# Patient Record
Sex: MALE | Race: WHITE | NOT HISPANIC OR LATINO | Employment: OTHER | URBAN - METROPOLITAN AREA
[De-identification: names, ages, dates, MRNs, and addresses within clinical notes are randomized per-mention and may not be internally consistent; named-entity substitution may affect disease eponyms.]

---

## 2017-01-10 ENCOUNTER — GENERIC CONVERSION - ENCOUNTER (OUTPATIENT)
Dept: OTHER | Facility: OTHER | Age: 72
End: 2017-01-10

## 2017-02-06 ENCOUNTER — GENERIC CONVERSION - ENCOUNTER (OUTPATIENT)
Dept: OTHER | Facility: OTHER | Age: 72
End: 2017-02-06

## 2017-02-22 ENCOUNTER — ALLSCRIPTS OFFICE VISIT (OUTPATIENT)
Dept: OTHER | Facility: OTHER | Age: 72
End: 2017-02-22

## 2017-06-05 DIAGNOSIS — N18.30 CHRONIC KIDNEY DISEASE, STAGE III (MODERATE) (HCC): ICD-10-CM

## 2017-06-13 ENCOUNTER — ALLSCRIPTS OFFICE VISIT (OUTPATIENT)
Dept: OTHER | Facility: OTHER | Age: 72
End: 2017-06-13

## 2017-12-04 DIAGNOSIS — N18.30 CHRONIC KIDNEY DISEASE, STAGE III (MODERATE) (HCC): ICD-10-CM

## 2017-12-05 ENCOUNTER — GENERIC CONVERSION - ENCOUNTER (OUTPATIENT)
Dept: OTHER | Facility: OTHER | Age: 72
End: 2017-12-05

## 2017-12-06 ENCOUNTER — ALLSCRIPTS OFFICE VISIT (OUTPATIENT)
Dept: OTHER | Facility: OTHER | Age: 72
End: 2017-12-06

## 2017-12-07 NOTE — PROGRESS NOTES
Assessment  1  CKD (chronic kidney disease), stage III (585 3) (N18 3)   2  Benign hypertension with chronic kidney disease, stage III (403 10,585 3) (I12 9,N18 3)   3  Diabetes mellitus with diabetic nephropathy (250 40,583 81) (E11 21)   4  Proteinuria (791 0) (R80 9)    Plan  CKD (chronic kidney disease), stage III    · HydroCHLOROthiazide 12 5 MG Oral Tablet; TAKE 1 TABLET DAILY   Rx By: Michele Lopez; Dispense: 90 Days ; #:90 Tablet; Refill: 2;For: CKD (chronic kidney disease), stage III; BRITTNY = N; Sent To: [facility]  · (1) BASIC METABOLIC PROFILE; Status:Active; Requested for:64Lwm3119;    Perform:[facility]; Due:40Bjk2195; Ordered; For:CKD (chronic kidney disease), stage III; Ordered By:Darion Ferrer;   · (1) CBC/ PLT (NO DIFF); Status:Active; Requested QEY:[id number]; Perform:[facility]; Due:04Jun2019;Ordered;(chronic kidney disease), stage III; Ordered By:Darion Ferrer;   · (1) MICROALBUMIN CREATININE RATIO, RANDOM URINE; Status:Active; Requestedfor:04Jun2018; Perform:[facility]; HSI:[id number];MFFTUQP; For:CKD (chronic kidney disease), stage III; Ordered By:Darion Ferrer;   · (1) PTH N-TERMINAL (INTACT); Status:Active; Requested UES:[id number]; Perform:[facility]; BNO:[id number];CAOHLOG; For:CKD (chronic kidney disease), stage III; Ordered By:Darion Ferrer;   · (1) RENAL FUNCTION PANEL; Status:Active; Requested WXA:[id number]; Perform:[facility]; Due:04Jun2019;Ordered;(chronic kidney disease), stage III; Ordered By:Alexandria Ferrer Miss;   · Follow-up visit in 6 months Evaluation and Treatment  Follow-up  Status: Hold For -Scheduling  Requested for: 43SUN1058   Ordered;CKD (chronic kidney disease), stage III; Ordered By: Michele Lopez Performed:  Due: 35EJQ5023    Discussion/Summary    Stage 3 chronic kidney disease, baseline creatinine 1 7, GFR 08/30/2039  his kidney function is fairly stable, baseline creatinine again at 1 7   His underlying disease is certainly multifactorial but some component diabetic nephropathy given his microalbuminuria  Unfortunately his blood pressure is still not optimally controlled, he is not currently taking a diuretic, would like to add small dose of hydrochlorothiazide, 12 5 mg daily  We will plan on repeating laboratory studies in a few weeks to ensure that his electrolytes and renal function are stable  Additionally he is to start checking his blood pressure at home and record this for diary  Obviously if it is not significantly improved his call the office for further recommendations  Otherwise I would like to see him in approximately 6 months with repeat laboratory studies that time  again likely secondary diabetic nephropathy, will add thiazide diuretic for better blood pressure control  hyperparathyroidism, PTH improved at 58 3, previously 97 6  The patient was counseled regarding instructions for management,-- impressions  The patient has the current Goals: Continued stability of his chronic kidney disease    improved blood pressure control  The patent has the current Barriers: None  Patient is able to Self-Care  Possible side effects of new medications were reviewed with the patient/guardian today  The treatment plan was reviewed with the patient/guardian  The patient/guardian understands and agrees with the treatment plan      Reason For Visit  chronic kidney disease      History of Present Illness  We had the pleasure of seeing Macrina Flores for nephrology follow-up secondary to previous diagnosis of stage 3 chronic kidney disease  has been doing reasonably well  He denies any significant chest pain, shortness of Breath overseen lower extremity swelling  His appetite has been normal  His blood sugars have improved, is now taking less Lantus  He denies any recent hospitalizations or illnesses  Review of Systems   Constitutional: no fever-- and-- no fatigue  Integumentary: no rashes    Gastrointestinal: no nausea,-- no diarrhea-- and-- no vomiting  Respiratory: no shortness of breath  Cardiovascular: lower extremity edema, but-- no chest pain  Musculoskeletal: no joint pain  Neurological: no lightheadedness-- and-- no dizziness  Genitourinary: no dysuria  Current Meds   1  AmLODIPine Besylate 10 MG Oral Tablet; TAKE 1 TABLET DAILY; Therapy: (Recorded:52Rdr1115) to Recorded   2  Aspirin 81 MG Oral Tablet Chewable; CHEW AND SWALLOW 1 TABLET DAILY; Therapy: (Recorded:22Cdc4148) to Recorded   3  Atorvastatin Calcium 40 MG Oral Tablet; Take 1 tablet daily; Therapy: (Recorded:54Vzf9621) to Recorded   4  Carvedilol 12 5 MG Oral Tablet; TAKE 1 TABLET TWICE DAILY WITH MEALS; Therapy: (Recorded:80Evz6264) to Recorded   5  Carvedilol 6 25 MG Oral Tablet; TAKE 1 TABLET TWICE DAILY WITH MEALS; Therapy: (Recorded:02Opk8637) to Recorded   6  Eliquis 5 MG Oral Tablet; Take 1 tablet twice daily; Therapy: (Recorded:50Leb8266) to Recorded   7  Lantus 100 UNIT/ML Subcutaneous Solution; INJECT 30 UNIT Daily; Therapy: (Recorded:89Vmh4158) to Recorded   8  Levothyroxine Sodium 112 MCG Oral Tablet; TAKE 1 TABLET DAILY; Therapy: (Recorded:34Poj3160) to Recorded   9  Levothyroxine Sodium 50 MCG Oral Tablet; TAKE 1 TABLET DAILY; Therapy: (Recorded:17Nnt2755) to Recorded   10  Ramipril 2 5 MG Oral Capsule; TAKE 1 CAPSULE ONCE DAILY; Therapy: (Recorded:54Yss1876) to Recorded    The medication list was reviewed and updated today  Allergies  1  No Known Drug Allergies    Vitals  Vital Signs    Recorded: 55QYV2500 03:19PM Recorded: 98HEC3954 02:47PM   Heart Rate  80   Systolic 913 195, Sitting   Diastolic 90 490, Sitting   Height  5 ft 10 in   Weight  235 lb    BMI Calculated  33 72   BSA Calculated  2 24       Physical Exam   Constitutional: General appearance: No acute distress, well appearing and well nourished  Eyes: Anicteric sclerae  JVD:  No JVD present    Pulmonary: Respiratory effort: No increased work of breathing or signs of respiratory distress  -- Auscultation of lungs: Clear to auscultation  Cardiovascular: Auscultation of heart: Normal rate and rhythm, normal S1 and S2, without murmurs  Abdomen: Non-tender, no masses  Extremities: Extremities are abnormal  Extremities: bilateral extremities have 1+ pitting edema  Rash: No rash present    Neurologic: Non Focal     Psychiatric: Orientation to person, place, and time: Normal  -- and-- Mood and affect: Normal        Results/Data  Nephrology Flowsheet 09BQY7323 01:53PM Kenan Scruggs     Test Name Result Flag Reference   WBC 7 8     Hemoglobin 12 6     Hematocrit 37 6     Platelets 035     Sodium 143     Potassium 4 8     Chloride 111     Carbon Dioxide 23     Calcium 8 9     GLUCOSE 104     BUN 40     Serum Creatinine 1 74     GFR, NON-AFRICAN AMERICAN 39     Phosphorus 2 8     Albumin 3 6     PTH 58 3     Uric Acid 7 7     Microalbumin Creatinine Ratio 778           Signatures   Electronically signed by : Pilar Allan DO; Dec  6 2017  3:20PM EST                       (Author)

## 2017-12-19 ENCOUNTER — GENERIC CONVERSION - ENCOUNTER (OUTPATIENT)
Dept: OTHER | Facility: OTHER | Age: 72
End: 2017-12-19

## 2018-01-12 VITALS
HEART RATE: 72 BPM | HEIGHT: 70 IN | BODY MASS INDEX: 33.64 KG/M2 | SYSTOLIC BLOOD PRESSURE: 118 MMHG | RESPIRATION RATE: 16 BRPM | DIASTOLIC BLOOD PRESSURE: 88 MMHG | WEIGHT: 235 LBS

## 2018-01-14 VITALS
DIASTOLIC BLOOD PRESSURE: 78 MMHG | HEIGHT: 70 IN | HEART RATE: 72 BPM | BODY MASS INDEX: 34.23 KG/M2 | WEIGHT: 239.13 LBS | RESPIRATION RATE: 16 BRPM | SYSTOLIC BLOOD PRESSURE: 132 MMHG

## 2018-01-23 VITALS
DIASTOLIC BLOOD PRESSURE: 90 MMHG | HEART RATE: 80 BPM | SYSTOLIC BLOOD PRESSURE: 160 MMHG | HEIGHT: 70 IN | WEIGHT: 235 LBS | BODY MASS INDEX: 33.64 KG/M2

## 2018-06-04 DIAGNOSIS — N18.30 CHRONIC KIDNEY DISEASE, STAGE III (MODERATE) (HCC): ICD-10-CM

## 2018-06-25 ENCOUNTER — OFFICE VISIT (OUTPATIENT)
Dept: NEPHROLOGY | Facility: CLINIC | Age: 73
End: 2018-06-25
Payer: COMMERCIAL

## 2018-06-25 VITALS — DIASTOLIC BLOOD PRESSURE: 88 MMHG | HEART RATE: 82 BPM | SYSTOLIC BLOOD PRESSURE: 148 MMHG

## 2018-06-25 DIAGNOSIS — I12.9 HYPERTENSIVE CHRONIC KIDNEY DISEASE WITH STAGE 1 THROUGH STAGE 4 CHRONIC KIDNEY DISEASE, OR UNSPECIFIED CHRONIC KIDNEY DISEASE: ICD-10-CM

## 2018-06-25 DIAGNOSIS — N18.30 CKD (CHRONIC KIDNEY DISEASE) STAGE 3, GFR 30-59 ML/MIN (HCC): Primary | ICD-10-CM

## 2018-06-25 DIAGNOSIS — N18.30 CKD STAGE 3 DUE TO TYPE 2 DIABETES MELLITUS (HCC): ICD-10-CM

## 2018-06-25 DIAGNOSIS — E11.22 CKD STAGE 3 DUE TO TYPE 2 DIABETES MELLITUS (HCC): ICD-10-CM

## 2018-06-25 PROCEDURE — 99213 OFFICE O/P EST LOW 20 MIN: CPT | Performed by: INTERNAL MEDICINE

## 2018-06-25 RX ORDER — CARVEDILOL 12.5 MG/1
1 TABLET ORAL 2 TIMES DAILY
COMMUNITY

## 2018-06-25 RX ORDER — LEVOTHYROXINE SODIUM 112 UG/1
1 TABLET ORAL DAILY
COMMUNITY

## 2018-06-25 RX ORDER — ATORVASTATIN CALCIUM 40 MG/1
1 TABLET, FILM COATED ORAL DAILY
COMMUNITY

## 2018-06-25 RX ORDER — INSULIN GLARGINE 100 [IU]/ML
25 INJECTION, SOLUTION SUBCUTANEOUS DAILY
COMMUNITY

## 2018-06-25 RX ORDER — RAMIPRIL 2.5 MG/1
1 CAPSULE ORAL DAILY
COMMUNITY
End: 2019-09-04 | Stop reason: SDUPTHER

## 2018-06-25 RX ORDER — LEVOTHYROXINE SODIUM 0.05 MG/1
1 TABLET ORAL DAILY
COMMUNITY

## 2018-06-25 RX ORDER — AMLODIPINE BESYLATE 10 MG/1
10 TABLET ORAL DAILY
COMMUNITY
Start: 2018-02-02 | End: 2021-09-13

## 2018-06-25 RX ORDER — FLUTICASONE PROPIONATE 50 MCG
SPRAY, SUSPENSION (ML) NASAL
COMMUNITY
Start: 2013-01-21

## 2018-06-25 RX ORDER — ASPIRIN 81 MG/1
1 TABLET, CHEWABLE ORAL DAILY
COMMUNITY

## 2018-06-25 NOTE — LETTER
June 25, 2018     Nina Abbott, [address] [facility]    Patient: Basilio Chirinos   YOB: 1945   Date of Visit: 6/25/2018     Dear Dr Manish Roberts      Thank you for referring Basilio Chirinos to me for evaluation  Below are the relevant portions of my assessment and plan of care  If you have questions, please do not hesitate to call me  I look forward to following Bhavin Espinoza along with you  Sincerely,        Renee Rose DO        CC: No Recipients    Progress Notes:    ASSESSMENT and PLAN:  1  Chronic kidney disease, stage III, most recent creatinine 1 62, estimated GFR 42  2  Diabetes with associated nephropathy  3  Peripheral vascular disease with recent left lower extremity amputation  4  Secondary hyperparathyroidism, PTH recently 40 2    · Overall renal function has remained fairly stable, creatinine slightly improved since his hospitalization  · Previously had added hydrochlorothiazide but will hold for now    Continue with low-dose ACE-inhibitor  · Continue to monitor blood pressure at home, call if consistently greater than 366 systolic  · Follow-up in 6 months with repeat laboratory studies at that time

## 2018-06-25 NOTE — PATIENT INSTRUCTIONS
ASSESSMENT and PLAN:  1  Chronic kidney disease, stage III, most recent creatinine 1 62, estimated GFR 42  2  Diabetes with associated nephropathy  3  Peripheral vascular disease with recent left lower extremity amputation  4  Secondary hyperparathyroidism, PTH recently 40 2    · Overall renal function has remained fairly stable, creatinine slightly improved since his hospitalization  · Previously had added hydrochlorothiazide but will hold for now    Continue with low-dose ACE-inhibitor  · Continue to monitor blood pressure at home, call if consistently greater than 904 systolic  · Follow-up in 6 months with repeat laboratory studies at that time

## 2018-06-25 NOTE — PROGRESS NOTES
NEPHROLOGY OUTPATIENT PROGRESS NOTE   Kailee Putnam 72 y o  male MRN: [id number]  Reason for visit:  Chronic kidney disease    ASSESSMENT and PLAN:  1  Chronic kidney disease, stage III, most recent creatinine 1 62, estimated GFR 42  2  Diabetes with associated nephropathy  3  Peripheral vascular disease with recent left lower extremity amputation  4  Secondary hyperparathyroidism, PTH recently 40 2    · Overall renal function has remained fairly stable, creatinine slightly improved since his hospitalization  · Previously had added hydrochlorothiazide but will hold for now  Continue with low-dose ACE-inhibitor  · Continue to monitor blood pressure at home, call if consistently greater than 998 systolic  · Follow-up in 6 months with repeat laboratory studies at that time    SUBJECTIVE / INTERVAL HISTORY:  Recently had admission to [facility] in January which resulted in a left below-the-knee amputation secondary to nonhealing wound  Reviewing his records at that time he did have some episodes of acute kidney injury which resolved, creatinine at discharge was approximately 2 0  Most recently 1 6  Currently he has 1 spot on his amputation site which will be addressed with his surgeon  Currently is being arranged for possible prosthesis placement  He denies any chest pain shortness of breath  Denies any abdominal pain, nausea vomiting diarrhea  Review of Systems      OBJECTIVE:  /88 (BP Location: Right arm, Patient Position: Sitting, Cuff Size: Adult)   Pulse 82     Physical Exam   Constitutional: He is oriented to person, place, and time  No distress  HENT:   Head: Normocephalic  Eyes: No scleral icterus  Neck: Neck supple  Cardiovascular: Normal rate and regular rhythm  Pulmonary/Chest: Effort normal and breath sounds normal    Abdominal: Soft  Bowel sounds are normal    Musculoskeletal: He exhibits edema (1+ right lower extremity edema)     Neurological: He is alert and oriented to person, place, and time  Skin: Skin is warm  Psychiatric: He has a normal mood and affect  Medications:    Current Outpatient Prescriptions:     amLODIPine (NORVASC) 10 mg tablet, Take 10 mg by mouth, Disp: , Rfl:     apixaban (ELIQUIS) 5 mg, Take 5 mg by mouth, Disp: , Rfl:     aspirin 81 mg chewable tablet, Chew 1 tablet daily, Disp: , Rfl:     atorvastatin (LIPITOR) 40 mg tablet, Take 1 tablet by mouth daily, Disp: , Rfl:     carvedilol (COREG) 12 5 mg tablet, Take 1 tablet by mouth Twice daily, Disp: , Rfl:     fluticasone (FLONASE) 50 mcg/act nasal spray, as needed  two sprays each nostril daily , Disp: , Rfl:     insulin glargine (LANTUS) 100 units/mL subcutaneous injection, Inject 30 Units under the skin daily, Disp: , Rfl:     levothyroxine 112 mcg tablet, Take 1 tablet by mouth daily, Disp: , Rfl:     levothyroxine 50 mcg tablet, Take 1 tablet by mouth daily, Disp: , Rfl:     ramipril (ALTACE) 2 5 mg capsule, Take 1 capsule by mouth daily, Disp: , Rfl:     Laboratory Results:  No results found for this or any previous visit

## 2019-02-21 LAB
CREAT ?TM UR-SCNC: 19 UMOL/L
EXT MICROALBUMIN URINE RANDOM: 22.1
MICROALBUMIN/CREAT UR: 1163.2 MG/G{CREAT}

## 2019-02-26 ENCOUNTER — TELEPHONE (OUTPATIENT)
Dept: NEPHROLOGY | Facility: CLINIC | Age: 74
End: 2019-02-26

## 2019-02-27 ENCOUNTER — OFFICE VISIT (OUTPATIENT)
Dept: NEPHROLOGY | Facility: CLINIC | Age: 74
End: 2019-02-27
Payer: COMMERCIAL

## 2019-02-27 VITALS
BODY MASS INDEX: 34.65 KG/M2 | DIASTOLIC BLOOD PRESSURE: 66 MMHG | WEIGHT: 242 LBS | HEART RATE: 80 BPM | SYSTOLIC BLOOD PRESSURE: 144 MMHG | HEIGHT: 70 IN

## 2019-02-27 DIAGNOSIS — N18.30 CKD STAGE 3 DUE TO TYPE 2 DIABETES MELLITUS (HCC): Primary | ICD-10-CM

## 2019-02-27 DIAGNOSIS — I12.9 HYPERTENSIVE CHRONIC KIDNEY DISEASE WITH STAGE 1 THROUGH STAGE 4 CHRONIC KIDNEY DISEASE, OR UNSPECIFIED CHRONIC KIDNEY DISEASE: ICD-10-CM

## 2019-02-27 DIAGNOSIS — R80.1 PERSISTENT PROTEINURIA: ICD-10-CM

## 2019-02-27 DIAGNOSIS — E11.22 CKD STAGE 3 DUE TO TYPE 2 DIABETES MELLITUS (HCC): Primary | ICD-10-CM

## 2019-02-27 PROCEDURE — 3066F NEPHROPATHY DOC TX: CPT | Performed by: INTERNAL MEDICINE

## 2019-02-27 PROCEDURE — 99214 OFFICE O/P EST MOD 30 MIN: CPT | Performed by: INTERNAL MEDICINE

## 2019-02-27 RX ORDER — FUROSEMIDE 40 MG/1
40 TABLET ORAL DAILY
COMMUNITY

## 2019-02-27 NOTE — PATIENT INSTRUCTIONS
ASSESSMENT and PLAN:  1  Chronic kidney disease, baseline creatinine 1 8-2 0, recent creatinine 1 9 estimated GFR 34  2  Diabetic nephropathy with associated proteinuria, last ratio approximately 1  3  Secondary hyperparathyroidism, PTH stable 108  4  Hypertension her blood pressure stable, continue with current regimen  5  History of congestive heart failure, volume status appears stable, continue with current diuretic regimen, suspect dry weight"closer to 235 lb    · Continue with current regimen  · Will plan to see him in 3 months with recheck on renal function as well as protein creatinine ratio is still significant proteinuria would likely start to increase his ACE-inhibitor    But given recent increase in diuretic dosing all leave his regimen the same

## 2019-02-27 NOTE — PROGRESS NOTES
NEPHROLOGY OUTPATIENT PROGRESS NOTE   Lyndon Owens 72 y o  male MRN: [id number]  Reason for visit:  Chronic kidney disease    ASSESSMENT and PLAN:  1  Chronic kidney disease, baseline creatinine 1 8-2 0, recent creatinine 1 9 estimated GFR 34  2  Diabetic nephropathy with associated proteinuria, last ratio approximately 1  3  Secondary hyperparathyroidism, PTH stable 108  4  Hypertension her blood pressure stable, continue with current regimen  5  History of congestive heart failure, volume status appears stable, continue with current diuretic regimen, suspect dry weight"closer to 235 lb    · Continue with current regimen  · Will plan to see him in 3 months with recheck on renal function as well as protein creatinine ratio is still significant proteinuria would likely start to increase his ACE-inhibitor  But given recent increase in diuretic dosing all leave his regimen the same    SUBJECTIVE / INTERVAL HISTORY:  He has been doing reasonably well  His last hospitalization was September of last year secondary to volume overload and congestive heart failure  His weight had gone up approximately 10 lb  His diuretic dosing recent was increased to 60 mg per day  Currently denies any chest pain shortness of breath or worsening swelling  Denies any abdominal bloating  His appetite has been stable  We did review his dietary history and there are some things that are high in salt content  Review of Systems      OBJECTIVE:  /66 (BP Location: Left arm, Patient Position: Sitting, Cuff Size: Large)   Pulse 80   Ht 5' 10" (1 778 m)   Wt 110 kg (242 lb)   BMI 34 72 kg/m²   Vitals:    02/27/19 1618   Weight: 110 kg (242 lb)       Physical Exam   Constitutional: He is oriented to person, place, and time  No distress  HENT:   Head: Normocephalic  Eyes: No scleral icterus  Neck: Neck supple  Cardiovascular: Normal rate and regular rhythm     Pulmonary/Chest: Effort normal and breath sounds normal  Abdominal: Soft  He exhibits no distension  Musculoskeletal: He exhibits edema (Plus one edema in the right lower leg)  Neurological: He is alert and oriented to person, place, and time  Skin: Skin is warm and dry  Medications:    Current Outpatient Medications:     amLODIPine (NORVASC) 10 mg tablet, Take 10 mg by mouth daily , Disp: , Rfl:     apixaban (ELIQUIS) 5 mg, Take 5 mg by mouth 2 (two) times a day , Disp: , Rfl:     aspirin 81 mg chewable tablet, Chew 1 tablet daily, Disp: , Rfl:     atorvastatin (LIPITOR) 40 mg tablet, Take 1 tablet by mouth daily, Disp: , Rfl:     carvedilol (COREG) 12 5 mg tablet, Take 1 tablet by mouth Twice daily, Disp: , Rfl:     fluticasone (FLONASE) 50 mcg/act nasal spray, as needed  two sprays each nostril daily , Disp: , Rfl:     furosemide (LASIX) 40 mg tablet, Take 60 mg by mouth daily, Disp: , Rfl:     insulin glargine (LANTUS) 100 units/mL subcutaneous injection, Inject 30 Units under the skin daily, Disp: , Rfl:     levothyroxine 112 mcg tablet, Take 1 tablet by mouth daily, Disp: , Rfl:     levothyroxine 50 mcg tablet, Take 1 tablet by mouth daily, Disp: , Rfl:     ramipril (ALTACE) 2 5 mg capsule, Take 1 capsule by mouth daily, Disp: , Rfl:     Laboratory Results:  Results for orders placed or performed in visit on 02/21/19   Microalbumin / creatinine urine ratio   Result Value Ref Range    EXT Creatinine Urine 19 0     Microalbum  ,U,Random 22 1     EXTERNAL Microalb/Creat Ratio 1,163 2

## 2019-02-27 NOTE — LETTER
February 27, 2019     Jeremy Barnett, [address] [facility] [address] [facility]    Patient: Lori Meza   YOB: 1945   Date of Visit: 2/27/2019     Dear Dr Magdaleno Said      Thank you for referring Lori Meza to me for evaluation  Below are the relevant portions of my assessment and plan of care  If you have questions, please do not hesitate to call me  I look forward to following Orquidea Early along with you  Sincerely,        Sarika Franco, DO        CC: No Recipients    Progress Notes:    ASSESSMENT and PLAN:  1  Chronic kidney disease, baseline creatinine 1 8-2 0, recent creatinine 1 9 estimated GFR 34  2  Diabetic nephropathy with associated proteinuria, last ratio approximately 1  3  Secondary hyperparathyroidism, PTH stable 108  4  Hypertension her blood pressure stable, continue with current regimen  5  History of congestive heart failure, volume status appears stable, continue with current diuretic regimen, suspect dry weight"closer to 235 lb    · Continue with current regimen  · Will plan to see him in 3 months with recheck on renal function as well as protein creatinine ratio is still significant proteinuria would likely start to increase his ACE-inhibitor    But given recent increase in diuretic dosing all leave his regimen the same

## 2019-06-08 LAB
CREAT ?TM UR-SCNC: 81.2 UMOL/L
EXT PROTEIN URINE: 53.6
PROT/CREAT UR: 0.66 MG/G{CREAT}

## 2019-06-11 ENCOUNTER — OFFICE VISIT (OUTPATIENT)
Dept: NEPHROLOGY | Facility: CLINIC | Age: 74
End: 2019-06-11
Payer: COMMERCIAL

## 2019-06-11 VITALS
HEART RATE: 68 BPM | SYSTOLIC BLOOD PRESSURE: 150 MMHG | WEIGHT: 233.6 LBS | BODY MASS INDEX: 33.44 KG/M2 | DIASTOLIC BLOOD PRESSURE: 60 MMHG | RESPIRATION RATE: 16 BRPM | HEIGHT: 70 IN

## 2019-06-11 DIAGNOSIS — E11.22 CKD STAGE 3 DUE TO TYPE 2 DIABETES MELLITUS (HCC): Primary | ICD-10-CM

## 2019-06-11 DIAGNOSIS — R80.1 PERSISTENT PROTEINURIA: ICD-10-CM

## 2019-06-11 DIAGNOSIS — D63.1 ANEMIA OF CHRONIC RENAL FAILURE, STAGE 3 (MODERATE) (HCC): ICD-10-CM

## 2019-06-11 DIAGNOSIS — R76.8 ELEVATED SERUM IMMUNOGLOBULIN FREE LIGHT CHAIN LEVEL: ICD-10-CM

## 2019-06-11 DIAGNOSIS — N18.30 ANEMIA OF CHRONIC RENAL FAILURE, STAGE 3 (MODERATE) (HCC): ICD-10-CM

## 2019-06-11 DIAGNOSIS — I12.9 HYPERTENSIVE CHRONIC KIDNEY DISEASE WITH STAGE 1 THROUGH STAGE 4 CHRONIC KIDNEY DISEASE, OR UNSPECIFIED CHRONIC KIDNEY DISEASE: ICD-10-CM

## 2019-06-11 DIAGNOSIS — N18.30 CKD STAGE 3 DUE TO TYPE 2 DIABETES MELLITUS (HCC): Primary | ICD-10-CM

## 2019-06-11 PROCEDURE — 3066F NEPHROPATHY DOC TX: CPT | Performed by: INTERNAL MEDICINE

## 2019-06-11 PROCEDURE — 99214 OFFICE O/P EST MOD 30 MIN: CPT | Performed by: INTERNAL MEDICINE

## 2019-08-30 LAB
CREAT ?TM UR-SCNC: 24.1 UMOL/L
EXT PROTEIN URINE: 24.5
PROT/CREAT UR: 1.02 MG/G{CREAT}

## 2019-09-04 ENCOUNTER — OFFICE VISIT (OUTPATIENT)
Dept: NEPHROLOGY | Facility: CLINIC | Age: 74
End: 2019-09-04
Payer: COMMERCIAL

## 2019-09-04 VITALS
RESPIRATION RATE: 15 BRPM | HEIGHT: 70 IN | SYSTOLIC BLOOD PRESSURE: 132 MMHG | DIASTOLIC BLOOD PRESSURE: 88 MMHG | HEART RATE: 84 BPM | WEIGHT: 230 LBS | BODY MASS INDEX: 32.93 KG/M2

## 2019-09-04 DIAGNOSIS — I12.9 HYPERTENSIVE CHRONIC KIDNEY DISEASE WITH STAGE 1 THROUGH STAGE 4 CHRONIC KIDNEY DISEASE, OR UNSPECIFIED CHRONIC KIDNEY DISEASE: ICD-10-CM

## 2019-09-04 DIAGNOSIS — N18.30 CKD STAGE 3 DUE TO TYPE 2 DIABETES MELLITUS (HCC): Primary | ICD-10-CM

## 2019-09-04 DIAGNOSIS — N18.30 ANEMIA OF CHRONIC RENAL FAILURE, STAGE 3 (MODERATE) (HCC): ICD-10-CM

## 2019-09-04 DIAGNOSIS — R76.8 ELEVATED SERUM IMMUNOGLOBULIN FREE LIGHT CHAIN LEVEL: ICD-10-CM

## 2019-09-04 DIAGNOSIS — D63.1 ANEMIA OF CHRONIC RENAL FAILURE, STAGE 3 (MODERATE) (HCC): ICD-10-CM

## 2019-09-04 DIAGNOSIS — E11.21 DIABETIC NEPHROPATHY ASSOCIATED WITH TYPE 2 DIABETES MELLITUS (HCC): ICD-10-CM

## 2019-09-04 DIAGNOSIS — E11.22 CKD STAGE 3 DUE TO TYPE 2 DIABETES MELLITUS (HCC): Primary | ICD-10-CM

## 2019-09-04 PROCEDURE — 99214 OFFICE O/P EST MOD 30 MIN: CPT | Performed by: INTERNAL MEDICINE

## 2019-09-04 RX ORDER — RAMIPRIL 5 MG/1
5 CAPSULE ORAL DAILY
Qty: 90 CAPSULE | Refills: 2 | Status: SHIPPED | OUTPATIENT
Start: 2019-09-04 | End: 2021-09-13

## 2019-09-04 NOTE — PROGRESS NOTES
NEPHROLOGY OUTPATIENT PROGRESS NOTE   Tory Flynn 69 y o  male MRN: [id number]  Reason for visit:  Chronic kidney disease      ASSESSMENT and PLAN:  1  Chronic kidney disease, stage III, previous baseline creatinine 1 7-1 9, most recent creatinine 2 15 with an estimated GFR 30, suspecting underlying disease likely secondary diabetic nephropathy  2  Diabetes, last hemoglobin A1c 7 4  3  Proteinuria, last protein creatinine ratio approximately 1 0, increase ramipril to 5 mg daily, repeat BMP in 1 week  4  Hyperuricemia without signs of gout, uric acid slightly improved to 10 0  5  Abnormal free light chain assays, previous serum in urine electrophoresis negative, awaiting repeat SPEP/UPEP  6  Anemia, hemoglobin improved at 12 5  7  Mineral bone disorder, PTH stable at 73 9, phosphorus 3 6, calcium 9 3    · Overall renal function remains fairly stable  · Try to increase ACE-inhibitor to 5 mg to help with proteinuria  · Awaiting SPEP/UPEP given abnormal free light chain assays  · Repeat laboratory studies in 1 month with increase in ACE-inhibitor  · Assuming repeat lab studies are stable, follow-up in 3 months with repeat labs at that time  SUBJECTIVE / INTERVAL HISTORY:  He has been doing reasonably well  Denies any recent hospitalizations or illnesses  Denies any chest pain shortness of breath or swelling  Denies any abdominal bloating  Appetite has been normal without episodes of nausea vomiting diarrhea  Review of Systems      OBJECTIVE:  /88 (BP Location: Right arm, Patient Position: Sitting, Cuff Size: Large)   Pulse 84   Resp 15   Ht 5' 10" (1 778 m)   Wt 104 kg (230 lb)   BMI 33 00 kg/m²   Vitals:    09/04/19 1551   Weight: 104 kg (230 lb)       Physical Exam   Constitutional: He is oriented to person, place, and time  No distress  HENT:   Head: Normocephalic  Eyes: No scleral icterus  Neck: Neck supple  Cardiovascular: Normal rate and regular rhythm     Pulmonary/Chest: Effort normal    Abdominal: Soft  Musculoskeletal: He exhibits no edema  Neurological: He is alert and oriented to person, place, and time  Skin: Skin is warm and dry  Medications:    Current Outpatient Medications:     amLODIPine (NORVASC) 10 mg tablet, Take 10 mg by mouth daily , Disp: , Rfl:     apixaban (ELIQUIS) 5 mg, Take 5 mg by mouth 2 (two) times a day , Disp: , Rfl:     aspirin 81 mg chewable tablet, Chew 1 tablet daily, Disp: , Rfl:     atorvastatin (LIPITOR) 40 mg tablet, Take 1 tablet by mouth daily, Disp: , Rfl:     carvedilol (COREG) 12 5 mg tablet, Take 1 tablet by mouth Twice daily, Disp: , Rfl:     fluticasone (FLONASE) 50 mcg/act nasal spray, as needed   two sprays each nostril daily , Disp: , Rfl:     furosemide (LASIX) 40 mg tablet, Take 60 mg by mouth daily, Disp: , Rfl:     insulin glargine (LANTUS) 100 units/mL subcutaneous injection, Inject 30 Units under the skin daily, Disp: , Rfl:     levothyroxine 112 mcg tablet, Take 1 tablet by mouth daily, Disp: , Rfl:     levothyroxine 50 mcg tablet, Take 1 tablet by mouth daily, Disp: , Rfl:     ramipril (ALTACE) 2 5 mg capsule, Take 1 capsule by mouth daily, Disp: , Rfl:     Laboratory Results:  Results for orders placed or performed in visit on 06/08/19   Protein / creatinine ratio, urine   Result Value Ref Range    PROTEIN UA 53 6     EXT Creatinine Urine 81 2     EXTERNAL Ur Prot/Creat Ratio 0 66

## 2019-09-04 NOTE — PATIENT INSTRUCTIONS
ASSESSMENT and PLAN:  1  Chronic kidney disease, stage III, previous baseline creatinine 1 7-1 9, most recent creatinine 2 15 with an estimated GFR 30, suspecting underlying disease likely secondary diabetic nephropathy  2  Diabetes, last hemoglobin A1c 7 4  3  Proteinuria, last protein creatinine ratio approximately 1 0, increase ramipril to 5 mg daily, repeat BMP in 1 week  4  Hyperuricemia without signs of gout, uric acid slightly improved to 10 0  5  Abnormal free light chain assays, previous serum in urine electrophoresis negative, awaiting repeat SPEP/UPEP  6  Anemia, hemoglobin improved at 12 5  7  Mineral bone disorder, PTH stable at 73 9, phosphorus 3 6, calcium 9 3    · Overall renal function remains fairly stable  · Try to increase ACE-inhibitor to 5 mg to help with proteinuria  · Awaiting SPEP/UPEP given abnormal free light chain assays  · Repeat laboratory studies in 1 month with increase in ACE-inhibitor  · Assuming repeat lab studies are stable, follow-up in 3 months with repeat labs at that time

## 2019-09-04 NOTE — LETTER
September 4, 2019     Muna Honeycutt, [address] [facility] [address] [facility]    Patient: Jenny Marks   YOB: 1945   Date of Visit: 9/4/2019       Dear Dr Cruz Axon: Thank you for referring Jenny Marks to me for evaluation  Below are the relevant portions of my assessment and plan of care  If you have questions, please do not hesitate to call me  I look forward to following Tommie Glaser along with you  Sincerely,        Pilar Allan,         CC: No Recipients                         ASSESSMENT and PLAN:  1  Chronic kidney disease, stage III, previous baseline creatinine 1 7-1 9, most recent creatinine 2 15 with an estimated GFR 30, suspecting underlying disease likely secondary diabetic nephropathy  2  Diabetes, last hemoglobin A1c 7 4  3  Proteinuria, last protein creatinine ratio approximately 1 0, increase ramipril to 5 mg daily, repeat BMP in 1 week  4  Hyperuricemia without signs of gout, uric acid slightly improved to 10 0  5  Abnormal free light chain assays, previous serum in urine electrophoresis negative, awaiting repeat SPEP/UPEP  6  Anemia, hemoglobin improved at 12 5  7  Mineral bone disorder, PTH stable at 73 9, phosphorus 3 6, calcium 9 3    · Overall renal function remains fairly stable  · Try to increase ACE-inhibitor to 5 mg to help with proteinuria  · Awaiting SPEP/UPEP given abnormal free light chain assays  · Repeat laboratory studies in 1 month with increase in ACE-inhibitor  · Assuming repeat lab studies are stable, follow-up in 3 months with repeat labs at that time

## 2019-10-03 ENCOUNTER — TELEPHONE (OUTPATIENT)
Dept: NEPHROLOGY | Facility: CLINIC | Age: 74
End: 2019-10-03

## 2019-12-12 LAB
CREAT ?TM UR-SCNC: 80.8 UMOL/L
EXT PROTEIN URINE: 54.6
PROT/CREAT UR: 0.68 MG/G{CREAT}

## 2019-12-13 ENCOUNTER — TELEPHONE (OUTPATIENT)
Dept: NEPHROLOGY | Facility: CLINIC | Age: 74
End: 2019-12-13

## 2019-12-13 NOTE — TELEPHONE ENCOUNTER
Left message for patient reminding him of his appointment on 12/18 with Dr Vance Rodriguez and also there is blood work to be done for the appointment

## 2019-12-18 ENCOUNTER — OFFICE VISIT (OUTPATIENT)
Dept: NEPHROLOGY | Facility: CLINIC | Age: 74
End: 2019-12-18
Payer: COMMERCIAL

## 2019-12-18 VITALS
WEIGHT: 236.4 LBS | DIASTOLIC BLOOD PRESSURE: 80 MMHG | HEART RATE: 80 BPM | BODY MASS INDEX: 33.84 KG/M2 | HEIGHT: 70 IN | SYSTOLIC BLOOD PRESSURE: 140 MMHG

## 2019-12-18 DIAGNOSIS — D63.1 ANEMIA OF CHRONIC RENAL FAILURE, STAGE 3 (MODERATE) (HCC): ICD-10-CM

## 2019-12-18 DIAGNOSIS — R76.8 ELEVATED SERUM IMMUNOGLOBULIN FREE LIGHT CHAIN LEVEL: ICD-10-CM

## 2019-12-18 DIAGNOSIS — N18.30 ANEMIA OF CHRONIC RENAL FAILURE, STAGE 3 (MODERATE) (HCC): ICD-10-CM

## 2019-12-18 DIAGNOSIS — I12.9 HYPERTENSIVE CHRONIC KIDNEY DISEASE WITH STAGE 1 THROUGH STAGE 4 CHRONIC KIDNEY DISEASE, OR UNSPECIFIED CHRONIC KIDNEY DISEASE: ICD-10-CM

## 2019-12-18 DIAGNOSIS — N18.30 CKD STAGE 3 DUE TO TYPE 2 DIABETES MELLITUS (HCC): Primary | ICD-10-CM

## 2019-12-18 DIAGNOSIS — R80.1 PERSISTENT PROTEINURIA: ICD-10-CM

## 2019-12-18 DIAGNOSIS — E11.21 DIABETIC NEPHROPATHY ASSOCIATED WITH TYPE 2 DIABETES MELLITUS (HCC): ICD-10-CM

## 2019-12-18 DIAGNOSIS — E11.22 CKD STAGE 3 DUE TO TYPE 2 DIABETES MELLITUS (HCC): Primary | ICD-10-CM

## 2019-12-18 PROCEDURE — 99214 OFFICE O/P EST MOD 30 MIN: CPT | Performed by: INTERNAL MEDICINE

## 2019-12-18 PROCEDURE — 3066F NEPHROPATHY DOC TX: CPT | Performed by: INTERNAL MEDICINE

## 2019-12-18 NOTE — LETTER
December 18, 2019     Yolanda Sandoval, [address] [facility] [address] [facility]    Patient: Kayla Payne   YOB: 1945   Date of Visit: 12/18/2019       Dear Dr Jonah Leonard: Thank you for referring Kayla Payne to me for evaluation  Below are the relevant portions of my assessment and plan of care  If you have questions, please do not hesitate to call me  I look forward to following Armida De La Fuente along with you  Sincerely,        Avril Viveros DO        CC: No Recipients                           ASSESSMENT and PLAN:  1  Chronic kidney disease, stage III, baseline creatinine previously 1 7-1 9 although recent baseline creatinine likely near 2 0, underlying disease likely diabetic nephropathy, most recent creatinine 2 0 with an estimated GFR of approximately 30  2  Diabetes with recent hemoglobin A1c reported at less than 7  3  Proteinuria, recent ratio 0 6, continue with ACE-inhibitor  4  Hyperuricemia, uric acid stable at approximately 10 0  5  Abnormal free light chain assay, repeat SPEP/UPEP as well as free light chain assay next visit  6  Anemia of chronic disease hemoglobin stable  7  Mineral bone disorder, PTH fairly stable 164 5    · Overall renal function remains fairly stable  · No changes to his current antihypertensive regimen  · Proteinuria slightly improved  · Will continue monitor closely, follow-up in approximately 6 months with repeat labs at that time

## 2019-12-18 NOTE — PROGRESS NOTES
NEPHROLOGY OUTPATIENT PROGRESS NOTE   Kayla Payne 69 y o  male MRN: [id number]  Reason for visit:  Chronic kidney disease    ASSESSMENT and PLAN:  1  Chronic kidney disease, stage III, baseline creatinine previously 1 7-1 9 although recent baseline creatinine likely near 2 0, underlying disease likely diabetic nephropathy, most recent creatinine 2 0 with an estimated GFR of approximately 30  2  Diabetes with recent hemoglobin A1c reported at less than 7  3  Proteinuria, recent ratio 0 6, continue with ACE-inhibitor  4  Hyperuricemia, uric acid stable at approximately 10 0  5  Abnormal free light chain assay, repeat SPEP/UPEP as well as free light chain assay next visit  6  Anemia of chronic disease hemoglobin stable  7  Mineral bone disorder, PTH fairly stable 164 5    · Overall renal function remains fairly stable  · No changes to his current antihypertensive regimen  · Proteinuria slightly improved  · Will continue monitor closely, follow-up in approximately 6 months with repeat labs at that time  SUBJECTIVE / INTERVAL HISTORY:  He states he has been doing well  Denies any recent hospitalizations or illnesses  Denies any chest pain shortness of breath  Denies abdominal bloating  Denies any worsening lower extremity swelling  Denies any wounds  Denies any dizziness lightheadedness or falling  Review of Systems      OBJECTIVE:  /80 (BP Location: Left arm, Patient Position: Sitting, Cuff Size: Adult)   Pulse 80   Ht 5' 10" (1 778 m)   Wt 107 kg (236 lb 6 4 oz)   BMI 33 92 kg/m²   Vitals:    12/18/19 1452   Weight: 107 kg (236 lb 6 4 oz)       Physical Exam   Constitutional: He is oriented to person, place, and time  No distress  HENT:   Head: Normocephalic  Eyes: No scleral icterus  Neck: Neck supple  Cardiovascular: Normal rate and regular rhythm  Pulmonary/Chest: Effort normal and breath sounds normal    Abdominal: Soft     Musculoskeletal: He exhibits edema (Right lower extremity edema, 1+)  Neurological: He is alert and oriented to person, place, and time  Skin: Skin is warm and dry  No rash noted  Medications:    Current Outpatient Medications:     amLODIPine (NORVASC) 10 mg tablet, Take 10 mg by mouth daily , Disp: , Rfl:     apixaban (ELIQUIS) 5 mg, Take 5 mg by mouth 2 (two) times a day , Disp: , Rfl:     aspirin 81 mg chewable tablet, Chew 1 tablet daily, Disp: , Rfl:     atorvastatin (LIPITOR) 40 mg tablet, Take 1 tablet by mouth daily, Disp: , Rfl:     carvedilol (COREG) 12 5 mg tablet, Take 1 tablet by mouth Twice daily, Disp: , Rfl:     fluticasone (FLONASE) 50 mcg/act nasal spray, as needed   two sprays each nostril daily , Disp: , Rfl:     furosemide (LASIX) 40 mg tablet, Take 60 mg by mouth daily, Disp: , Rfl:     insulin glargine (LANTUS) 100 units/mL subcutaneous injection, Inject 30 Units under the skin daily, Disp: , Rfl:     levothyroxine 112 mcg tablet, Take 1 tablet by mouth daily, Disp: , Rfl:     levothyroxine 50 mcg tablet, Take 1 tablet by mouth daily, Disp: , Rfl:     ramipril (ALTACE) 5 mg capsule, Take 1 capsule (5 mg total) by mouth daily for 90 days, Disp: 90 capsule, Rfl: 2    Laboratory Results:  Results for orders placed or performed in visit on 12/12/19   Protein / creatinine ratio, urine   Result Value Ref Range    PROTEIN UA 54 6     EXT Creatinine Urine 80 8     EXTERNAL Ur Prot/Creat Ratio 0 68

## 2020-07-03 LAB
CREAT ?TM UR-SCNC: 114 UMOL/L
EXT PROTEIN URINE: 21.4
PROT/CREAT UR: 0.19 MG/G{CREAT}

## 2020-07-08 ENCOUNTER — OFFICE VISIT (OUTPATIENT)
Dept: NEPHROLOGY | Facility: CLINIC | Age: 75
End: 2020-07-08
Payer: COMMERCIAL

## 2020-07-08 VITALS
WEIGHT: 227 LBS | HEART RATE: 74 BPM | DIASTOLIC BLOOD PRESSURE: 60 MMHG | HEIGHT: 70 IN | SYSTOLIC BLOOD PRESSURE: 136 MMHG | TEMPERATURE: 79.9 F | BODY MASS INDEX: 32.5 KG/M2

## 2020-07-08 DIAGNOSIS — R76.8 ELEVATED SERUM IMMUNOGLOBULIN FREE LIGHT CHAIN LEVEL: ICD-10-CM

## 2020-07-08 DIAGNOSIS — N18.30 ANEMIA OF CHRONIC RENAL FAILURE, STAGE 3 (MODERATE) (HCC): ICD-10-CM

## 2020-07-08 DIAGNOSIS — I12.9 HYPERTENSIVE CHRONIC KIDNEY DISEASE WITH STAGE 1 THROUGH STAGE 4 CHRONIC KIDNEY DISEASE, OR UNSPECIFIED CHRONIC KIDNEY DISEASE: ICD-10-CM

## 2020-07-08 DIAGNOSIS — D63.1 ANEMIA OF CHRONIC RENAL FAILURE, STAGE 3 (MODERATE) (HCC): ICD-10-CM

## 2020-07-08 DIAGNOSIS — N18.4 CKD STAGE 4 DUE TO TYPE 2 DIABETES MELLITUS (HCC): Primary | ICD-10-CM

## 2020-07-08 DIAGNOSIS — R80.1 PERSISTENT PROTEINURIA: ICD-10-CM

## 2020-07-08 DIAGNOSIS — E11.22 CKD STAGE 4 DUE TO TYPE 2 DIABETES MELLITUS (HCC): Primary | ICD-10-CM

## 2020-07-08 DIAGNOSIS — E11.21 DIABETIC NEPHROPATHY ASSOCIATED WITH TYPE 2 DIABETES MELLITUS (HCC): ICD-10-CM

## 2020-07-08 PROCEDURE — 99214 OFFICE O/P EST MOD 30 MIN: CPT | Performed by: INTERNAL MEDICINE

## 2020-07-08 RX ORDER — FERROUS SULFATE TAB EC 324 MG (65 MG FE EQUIVALENT) 324 (65 FE) MG
324 TABLET DELAYED RESPONSE ORAL
Qty: 180 TABLET | Refills: 3 | Status: SHIPPED | OUTPATIENT
Start: 2020-07-08 | End: 2021-09-13 | Stop reason: ALTCHOICE

## 2020-07-08 NOTE — LETTER
July 8, 2020     Jose M Valderrama MD  [address] [place] [address] [address] [facility]    Patient: Jody Balbuena   YOB: 1945   Date of Visit: 7/8/2020       Dear Dr Sylvester Morning: Thank you for referring Jody Balbuena to me for evaluation  Below are the relevant portions of my assessment and plan of care  If you have questions, please do not hesitate to call me  I look forward to following Cam Showers along with you  Sincerely,        Danielle Madden, DO        CC: Kota Marie MD                         ASSESSMENT and PLAN:  1  Chronic kidney disease stage 4, baseline creatinine previously near 2 0 most recently has been in the 2s with a most recent creatinine of 2 7, estimated GFR of 22, etiology likely secondary to diabetes  2  Recent history of volume overload/congestive heart failure with associated acute kidney injury, peak creatinine at that time 3 5, discharge creatinine approximately 2 4  3  Anemia of chronic kidney disease, initiated on iron therapy 1 tablet b i d   4  Secondary hyperparathyroidism, PTH is worse at 2 9, start calcitriol 3 times weekly  5  Abnormal free light chain assay with normal serum and urine electrophoresis will need to continue following --previous hepatitis as well as ERNESTINA negative  Given CKD, reportedly with still within normal values  6  Hypertension, blood pressure appears stable  7   History of congestive heart failure, volume status overall stable    · Given recent creatinine of 2 7, unfortunately is progressing towards end-stage renal disease  · Referral to [facility] for further evaluation  · Repeat laboratory studies in 1 month, assuming no significant improvement will need to follow up with his vascular surgeon, Dr Luis Helton, for AV access placement  · Start oral iron therapy as well as calcitriol  · Follow-up in 3 months

## 2020-07-08 NOTE — PROGRESS NOTES
NEPHROLOGY OUTPATIENT PROGRESS NOTE   Fredo Andrews 70 y o  male MRN: [id number]  Reason for visit:  Chronic kidney disease    ASSESSMENT and PLAN:  1  Chronic kidney disease stage 4, baseline creatinine previously near 2 0 most recently has been in the 2s with a most recent creatinine of 2 7, estimated GFR of 22, etiology likely secondary to diabetes  2  Recent history of volume overload/congestive heart failure with associated acute kidney injury, peak creatinine at that time 3 5, discharge creatinine approximately 2 4  3  Anemia of chronic kidney disease, initiated on iron therapy 1 tablet b i d   4  Secondary hyperparathyroidism, PTH is worse at 2 9, start calcitriol 3 times weekly  5  Abnormal free light chain assay with normal serum and urine electrophoresis will need to continue following --previous hepatitis as well as ERNESTINA negative  Given CKD, reportedly with still within normal values  6  Hypertension, blood pressure appears stable  7  History of congestive heart failure, volume status overall stable    · Given recent creatinine of 2 7, unfortunately is progressing towards end-stage renal disease  · Referral to [facility] for further evaluation  · Repeat laboratory studies in 1 month, assuming no significant improvement will need to follow up with his vascular surgeon, Dr Doris Melo, for AV access placement  · Start oral iron therapy as well as calcitriol  · Follow-up in 3 months    SUBJECTIVE / INTERVAL HISTORY:  Unfortunately was recently admitted in May secondary to volume overload/congestive heart failure  At that time he did have acute kidney injury with a creatinine of 3 5  No adjustments were made to his oral diuretics improved with IV diuretics during his hospital stay  Weight has been stable since then  Currently denies any chest pain shortness of breath or worsening swelling  His appetite has been stable  Denies any headaches    Denies any dizziness lightheadedness or falls       OBJECTIVE:  /60 (BP Location: Left arm, Patient Position: Sitting, Cuff Size: Adult)   Pulse 74   Temp (!) 79 9 °F (26 6 °C) (Oral)   Ht 5' 10" (1 778 m)   Wt 103 kg (227 lb)   BMI 32 57 kg/m²   Vitals:    07/08/20 1421   Weight: 103 kg (227 lb)       Physical Exam   Constitutional: He is oriented to person, place, and time  He appears well-developed  No distress  HENT:   Head: Normocephalic and atraumatic  Eyes: No scleral icterus  Cardiovascular: Normal rate and regular rhythm  Pulmonary/Chest: Effort normal and breath sounds normal    Abdominal: Soft  He exhibits no distension  There is no tenderness  Musculoskeletal: He exhibits edema (Right lower extremity edema) and deformity (Left amputation)  Neurological: He is alert and oriented to person, place, and time  Skin: Skin is warm and dry  No rash noted  Medications:    Current Outpatient Medications:     amLODIPine (NORVASC) 10 mg tablet, Take 10 mg by mouth daily , Disp: , Rfl:     apixaban (ELIQUIS) 5 mg, Take 5 mg by mouth 2 (two) times a day , Disp: , Rfl:     aspirin 81 mg chewable tablet, Chew 1 tablet daily, Disp: , Rfl:     atorvastatin (LIPITOR) 40 mg tablet, Take 1 tablet by mouth daily, Disp: , Rfl:     carvedilol (COREG) 12 5 mg tablet, Take 1 tablet by mouth Twice daily, Disp: , Rfl:     fluticasone (FLONASE) 50 mcg/act nasal spray, as needed   two sprays each nostril daily , Disp: , Rfl:     furosemide (LASIX) 40 mg tablet, Take 60 mg by mouth daily, Disp: , Rfl:     insulin glargine (LANTUS) 100 units/mL subcutaneous injection, Inject 30 Units under the skin daily, Disp: , Rfl:     levothyroxine 112 mcg tablet, Take 1 tablet by mouth daily, Disp: , Rfl:     levothyroxine 50 mcg tablet, Take 1 tablet by mouth daily, Disp: , Rfl:     ramipril (ALTACE) 5 mg capsule, Take 1 capsule (5 mg total) by mouth daily for 90 days, Disp: 90 capsule, Rfl: 2    ferrous sulfate 324 (65 Fe) mg, Take 1 tablet (324 mg total) by mouth 2 (two) times a day before meals, Disp: 180 tablet, Rfl: 3    Laboratory Results:  Results for orders placed or performed in visit on 07/03/20   Protein / creatinine ratio, urine   Result Value Ref Range    PROTEIN UA 21 4     EXT Creatinine Urine 114     EXTERNAL Ur Prot/Creat Ratio 0 19

## 2020-08-12 ENCOUNTER — TELEPHONE (OUTPATIENT)
Dept: NEPHROLOGY | Facility: CLINIC | Age: 75
End: 2020-08-12

## 2020-08-12 DIAGNOSIS — E11.22 CKD STAGE 4 DUE TO TYPE 2 DIABETES MELLITUS (HCC): Primary | ICD-10-CM

## 2020-08-12 DIAGNOSIS — N18.4 CKD STAGE 4 DUE TO TYPE 2 DIABETES MELLITUS (HCC): Primary | ICD-10-CM

## 2020-08-12 NOTE — TELEPHONE ENCOUNTER
Pt's wife called asking to speak with Dr Emely Alarcon regarding pt's most recent BMP results from 7/7

## 2020-08-12 NOTE — TELEPHONE ENCOUNTER
Discussed results with patient  We have reduce his Lasix to 40 mg daily  Repeat BMP in 2 weeks, please send lab request patient's home    Left a message with vascular surgery, Dr Leland Ponce, regarding AV access placement +  Thank you

## 2020-10-20 LAB
CREAT ?TM UR-SCNC: 134 UMOL/L
EXT MICROALBUMIN URINE RANDOM: 12.4
MICROALBUMIN/CREAT UR: 92.5 MG/G{CREAT}

## 2020-10-28 ENCOUNTER — OFFICE VISIT (OUTPATIENT)
Dept: NEPHROLOGY | Facility: CLINIC | Age: 75
End: 2020-10-28
Payer: COMMERCIAL

## 2020-10-28 VITALS
BODY MASS INDEX: 31.92 KG/M2 | HEIGHT: 70 IN | HEART RATE: 74 BPM | WEIGHT: 223 LBS | DIASTOLIC BLOOD PRESSURE: 80 MMHG | SYSTOLIC BLOOD PRESSURE: 146 MMHG | TEMPERATURE: 98.2 F

## 2020-10-28 DIAGNOSIS — N18.4 STAGE 4 CHRONIC KIDNEY DISEASE (HCC): Primary | ICD-10-CM

## 2020-10-28 DIAGNOSIS — D63.1 ANEMIA OF CHRONIC RENAL FAILURE, STAGE 4 (SEVERE) (HCC): ICD-10-CM

## 2020-10-28 DIAGNOSIS — I12.9 HYPERTENSIVE CHRONIC KIDNEY DISEASE WITH STAGE 1 THROUGH STAGE 4 CHRONIC KIDNEY DISEASE, OR UNSPECIFIED CHRONIC KIDNEY DISEASE: ICD-10-CM

## 2020-10-28 DIAGNOSIS — N18.4 ANEMIA OF CHRONIC RENAL FAILURE, STAGE 4 (SEVERE) (HCC): ICD-10-CM

## 2020-10-28 DIAGNOSIS — E11.22 CKD STAGE 4 DUE TO TYPE 2 DIABETES MELLITUS (HCC): ICD-10-CM

## 2020-10-28 DIAGNOSIS — E11.21 DIABETIC NEPHROPATHY ASSOCIATED WITH TYPE 2 DIABETES MELLITUS (HCC): ICD-10-CM

## 2020-10-28 DIAGNOSIS — N25.81 SECONDARY HYPERPARATHYROIDISM (HCC): ICD-10-CM

## 2020-10-28 DIAGNOSIS — N18.4 CKD STAGE 4 DUE TO TYPE 2 DIABETES MELLITUS (HCC): ICD-10-CM

## 2020-10-28 PROCEDURE — 99214 OFFICE O/P EST MOD 30 MIN: CPT | Performed by: NURSE PRACTITIONER

## 2020-10-28 RX ORDER — CALCITRIOL 0.25 UG/1
0.25 CAPSULE, LIQUID FILLED ORAL DAILY
Qty: 40 CAPSULE | Refills: 3 | Status: SHIPPED | OUTPATIENT
Start: 2020-10-28

## 2020-12-30 ENCOUNTER — TELEPHONE (OUTPATIENT)
Dept: NEPHROLOGY | Facility: CLINIC | Age: 75
End: 2020-12-30

## 2021-02-23 ENCOUNTER — OFFICE VISIT (OUTPATIENT)
Dept: NEPHROLOGY | Facility: CLINIC | Age: 76
End: 2021-02-23
Payer: COMMERCIAL

## 2021-02-23 VITALS
BODY MASS INDEX: 32.35 KG/M2 | WEIGHT: 226 LBS | RESPIRATION RATE: 18 BRPM | HEART RATE: 64 BPM | SYSTOLIC BLOOD PRESSURE: 136 MMHG | DIASTOLIC BLOOD PRESSURE: 62 MMHG | HEIGHT: 70 IN

## 2021-02-23 DIAGNOSIS — D63.1 ANEMIA OF CHRONIC RENAL FAILURE, STAGE 4 (SEVERE) (HCC): ICD-10-CM

## 2021-02-23 DIAGNOSIS — R76.8 ELEVATED SERUM IMMUNOGLOBULIN FREE LIGHT CHAIN LEVEL: ICD-10-CM

## 2021-02-23 DIAGNOSIS — N18.4 CKD STAGE 4 DUE TO TYPE 2 DIABETES MELLITUS (HCC): Primary | ICD-10-CM

## 2021-02-23 DIAGNOSIS — N18.4 ANEMIA OF CHRONIC RENAL FAILURE, STAGE 4 (SEVERE) (HCC): ICD-10-CM

## 2021-02-23 DIAGNOSIS — R80.1 PERSISTENT PROTEINURIA: ICD-10-CM

## 2021-02-23 DIAGNOSIS — I12.9 HYPERTENSIVE CHRONIC KIDNEY DISEASE WITH STAGE 1 THROUGH STAGE 4 CHRONIC KIDNEY DISEASE, OR UNSPECIFIED CHRONIC KIDNEY DISEASE: ICD-10-CM

## 2021-02-23 DIAGNOSIS — N25.81 SECONDARY HYPERPARATHYROIDISM (HCC): ICD-10-CM

## 2021-02-23 DIAGNOSIS — E11.21 DIABETIC NEPHROPATHY ASSOCIATED WITH TYPE 2 DIABETES MELLITUS (HCC): ICD-10-CM

## 2021-02-23 DIAGNOSIS — E11.22 CKD STAGE 4 DUE TO TYPE 2 DIABETES MELLITUS (HCC): Primary | ICD-10-CM

## 2021-02-23 PROCEDURE — 99214 OFFICE O/P EST MOD 30 MIN: CPT | Performed by: INTERNAL MEDICINE

## 2021-02-23 NOTE — PROGRESS NOTES
NEPHROLOGY OUTPATIENT PROGRESS NOTE   [facility] 70 [facility] MRN: [id number]  Reason for visit:  Chronic kidney disease    ASSESSMENT and PLAN:  1  Chronic kidney disease, stage IV, current creatinine 2 99 estimated GFR 20, underlying disease secondary to diabetic nephropathy  2  Postoperative right upper arm AV fistula appears well matured, would check arterial/venous Doppler  3  Secondary hyperparathyroidism, PTH stable 133, vitamin-D level low at 16, start vitamin-D replacement 2000 units per day  4  Hypertension appears well controlled, continue with current regimen  5  Lower extremity edema, stable, continue with current diuretic regimen  6  Peripheral vascular disease following with vascular surgery  7  Abnormal free light chain assay, improving at 1 9, no evidence of monoclonal gammopathy    · Overall renal function remains fairly stable  · Blood pressure volume status acceptable  · Would check arterial/venous Doppler given right upper arm AV fistula  · No changes in current regimen  · Follow-up in 3 months with repeat labs at that time  SUBJECTIVE / INTERVAL HISTORY:  Since her last visit he has been feeling better he recently had a pacemaker replacement, previously his heart rate was in the 40s currently now in the 60s  Does not feel as tired and fatigued  Feels he has more energy  Denies any chest pain shortness of breath  Lower extremity swelling is unchanged  Appetite is normal       OBJECTIVE:  /62 (BP Location: Left arm, Patient Position: Sitting, Cuff Size: Large)   Pulse 64   Resp 18   Ht 5' 10" (1 778 m)   Wt 103 kg (226 lb)   BMI 32 43 kg/m²   Vitals:    02/23/21 1022   Weight: 103 kg (226 lb)       Physical Exam  Constitutional:       Appearance: He is not ill-appearing  HENT:      Head: Normocephalic and atraumatic  Eyes:      General: No scleral icterus  Cardiovascular:      Rate and Rhythm: Normal rate and regular rhythm     Pulmonary:      Effort: Pulmonary effort is normal       Breath sounds: Normal breath sounds  Abdominal:      Palpations: Abdomen is soft  Musculoskeletal:         General: Deformity (Left lower extremity amputation) present  Right lower leg: Edema present  Left lower leg: No edema  Comments: Right upper arm AV fistula positive bruit and thrill   Skin:     General: Skin is warm and dry  Findings: No rash  Neurological:      Mental Status: He is alert and oriented to person, place, and time  Medications:    Current Outpatient Medications:     amLODIPine (NORVASC) 10 mg tablet, Take 10 mg by mouth daily , Disp: , Rfl:     apixaban (ELIQUIS) 5 mg, Take 5 mg by mouth 2 (two) times a day , Disp: , Rfl:     aspirin 81 mg chewable tablet, Chew 1 tablet daily, Disp: , Rfl:     atorvastatin (LIPITOR) 40 mg tablet, Take 1 tablet by mouth daily, Disp: , Rfl:     calcitriol (ROCALTROL) 0 25 mcg capsule, Take 1 capsule (0 25 mcg total) by mouth daily, Disp: 40 capsule, Rfl: 3    carvedilol (COREG) 12 5 mg tablet, Take 1 tablet by mouth Twice daily, Disp: , Rfl:     ferrous sulfate 324 (65 Fe) mg, Take 1 tablet (324 mg total) by mouth 2 (two) times a day before meals, Disp: 180 tablet, Rfl: 3    fluticasone (FLONASE) 50 mcg/act nasal spray, as needed   two sprays each nostril daily , Disp: , Rfl:     furosemide (LASIX) 40 mg tablet, Take 40 mg by mouth daily, Disp: , Rfl:     insulin glargine (LANTUS) 100 units/mL subcutaneous injection, Inject 25 Units under the skin daily , Disp: , Rfl:     levothyroxine 112 mcg tablet, Take 1 tablet by mouth daily, Disp: , Rfl:     levothyroxine 50 mcg tablet, Take 1 tablet by mouth daily, Disp: , Rfl:     ramipril (ALTACE) 5 mg capsule, Take 1 capsule (5 mg total) by mouth daily for 90 days, Disp: 90 capsule, Rfl: 2    Laboratory Results:  Results for orders placed or performed in visit on 10/20/20   Microalbumin / creatinine urine ratio   Result Value Ref Range    EXT Creatinine Urine 134     Microalbum  ,U,Random 12 4     EXTERNAL Microalb/Creat Ratio 92 5

## 2021-05-11 LAB
CREAT ?TM UR-SCNC: 82 UMOL/L
EXT PROTEIN URINE: 28.5
PROT/CREAT UR: 0.35 MG/G{CREAT}

## 2021-05-17 ENCOUNTER — OFFICE VISIT (OUTPATIENT)
Dept: NEPHROLOGY | Facility: CLINIC | Age: 76
End: 2021-05-17
Payer: COMMERCIAL

## 2021-05-17 VITALS
DIASTOLIC BLOOD PRESSURE: 70 MMHG | HEART RATE: 67 BPM | RESPIRATION RATE: 18 BRPM | BODY MASS INDEX: 32.5 KG/M2 | SYSTOLIC BLOOD PRESSURE: 138 MMHG | HEIGHT: 70 IN | WEIGHT: 227 LBS

## 2021-05-17 DIAGNOSIS — R76.8 ELEVATED SERUM IMMUNOGLOBULIN FREE LIGHT CHAIN LEVEL: ICD-10-CM

## 2021-05-17 DIAGNOSIS — N25.81 SECONDARY HYPERPARATHYROIDISM (HCC): ICD-10-CM

## 2021-05-17 DIAGNOSIS — R80.1 PERSISTENT PROTEINURIA: ICD-10-CM

## 2021-05-17 DIAGNOSIS — N18.4 ANEMIA OF CHRONIC RENAL FAILURE, STAGE 4 (SEVERE) (HCC): ICD-10-CM

## 2021-05-17 DIAGNOSIS — E11.22 CKD STAGE 4 DUE TO TYPE 2 DIABETES MELLITUS (HCC): Primary | ICD-10-CM

## 2021-05-17 DIAGNOSIS — I12.9 HYPERTENSIVE CHRONIC KIDNEY DISEASE WITH STAGE 1 THROUGH STAGE 4 CHRONIC KIDNEY DISEASE, OR UNSPECIFIED CHRONIC KIDNEY DISEASE: ICD-10-CM

## 2021-05-17 DIAGNOSIS — N18.4 CKD STAGE 4 DUE TO TYPE 2 DIABETES MELLITUS (HCC): Primary | ICD-10-CM

## 2021-05-17 DIAGNOSIS — D63.1 ANEMIA OF CHRONIC RENAL FAILURE, STAGE 4 (SEVERE) (HCC): ICD-10-CM

## 2021-05-17 DIAGNOSIS — E11.21 DIABETIC NEPHROPATHY ASSOCIATED WITH TYPE 2 DIABETES MELLITUS (HCC): ICD-10-CM

## 2021-05-17 PROCEDURE — 99214 OFFICE O/P EST MOD 30 MIN: CPT | Performed by: INTERNAL MEDICINE

## 2021-05-17 NOTE — LETTER
May 17, 2021     Heidi Haque, [address] [facility] [address] [facility]    Patient: David Box   YOB: 1945   Date of Visit: 5/17/2021       Dear Dr Garcia Cea: Thank you for referring David Box to me for evaluation  Below are the relevant portions of my assessment and plan of care  If you have questions, please do not hesitate to call me  I look forward to following Mariia Cuevas along with you  Sincerely,        Geetha Montano,         CC: No Recipients                         ASSESSMENT and PLAN:  1  Chronic kidney disease, stage IV, baseline creatinine near 3 0 most recent creatinine 3 01 with an estimated GFR of 20   2  Anemia of chronic kidney disease hemoglobin stable at 9 7, iron studies appear adequate  3  CKD associated mineral bone disorder, PTH within  Ranged at 137 5, repeat 25 hydroxy vitamin-D  4  Hyperuricemia, patient declines allopurinol for now, no signs of gout likely related to CKD   5  Hypertension, blood pressure appears stable   6  History of congestive heart failure, volume status acceptable   7  Right upper extremity AV fistula, does not appear mature, would check AV fistula Doppler  8   Abnormal free light chain assay, repeat SPEP /UPEP      Overall renal function remains stable   Would check av fistula Doppler   No changes in current prescription   Follow-up in 3 months with repeat labs at that time

## 2021-05-17 NOTE — PROGRESS NOTES
NEPHROLOGY OUTPATIENT PROGRESS NOTE   Willy Gonzalez 78 y o  male MRN: [id number]  Reason for visit:  Chronic kidney disease    ASSESSMENT and PLAN:  1  Chronic kidney disease, stage IV, baseline creatinine near 3 0 most recent creatinine 3 01 with an estimated GFR of 20   2  Anemia of chronic kidney disease hemoglobin stable at 9 7, iron studies appear adequate  3  CKD associated mineral bone disorder, PTH within  Ranged at 137 5, repeat 25 hydroxy vitamin-D  4  Hyperuricemia, patient declines allopurinol for now, no signs of gout likely related to CKD   5  Hypertension, blood pressure appears stable   6  History of congestive heart failure, volume status acceptable   7  Right upper extremity AV fistula, does not appear mature, would check AV fistula Doppler  8  Abnormal free light chain assay, repeat SPEP /UPEP      Overall renal function remains stable   Would check av fistula Doppler   No changes in current prescription   Follow-up in 3 months with repeat labs at that time    SUBJECTIVE / INTERVAL HISTORY:    he has been doing well  Denies any recent hospitalizations or illnesses  Denies any chest pain shortness of breath or worsening swelling  Appetite has been stable  Denies any metallic taste  OBJECTIVE:  /70 (BP Location: Left arm, Patient Position: Sitting, Cuff Size: Standard)   Pulse 67   Resp 18   Ht 5' 10" (1 778 m)   Wt 103 kg (227 lb)   BMI 32 57 kg/m²   Vitals:    05/17/21 1524   Weight: 103 kg (227 lb)       Physical Exam  Constitutional:       Appearance: He is not ill-appearing  HENT:      Head: Normocephalic and atraumatic  Eyes:      General: No scleral icterus  Cardiovascular:      Rate and Rhythm: Normal rate and regular rhythm  Pulmonary:      Effort: Pulmonary effort is normal       Breath sounds: Normal breath sounds  Abdominal:      General: There is no distension  Palpations: Abdomen is soft  Musculoskeletal:      Right lower leg: Edema present  Left lower leg: No edema  Comments: AV fistula, positive bruit and thrill although caliber appears small,  dissipated thrill   Skin:     General: Skin is warm and dry  Findings: No rash  Neurological:      Mental Status: He is alert and oriented to person, place, and time  Medications:    Current Outpatient Medications:     amLODIPine (NORVASC) 10 mg tablet, Take 10 mg by mouth daily , Disp: , Rfl:     apixaban (ELIQUIS) 5 mg, Take 5 mg by mouth 2 (two) times a day , Disp: , Rfl:     aspirin 81 mg chewable tablet, Chew 1 tablet daily, Disp: , Rfl:     atorvastatin (LIPITOR) 40 mg tablet, Take 1 tablet by mouth daily, Disp: , Rfl:     calcitriol (ROCALTROL) 0 25 mcg capsule, Take 1 capsule (0 25 mcg total) by mouth daily, Disp: 40 capsule, Rfl: 3    carvedilol (COREG) 12 5 mg tablet, Take 1 tablet by mouth Twice daily, Disp: , Rfl:     Cholecalciferol 50 MCG (2000 UT) CAPS, Take 1 capsule by mouth daily, Disp: , Rfl:     ferrous sulfate 324 (65 Fe) mg, Take 1 tablet (324 mg total) by mouth 2 (two) times a day before meals, Disp: 180 tablet, Rfl: 3    fluticasone (FLONASE) 50 mcg/act nasal spray, as needed   two sprays each nostril daily , Disp: , Rfl:     furosemide (LASIX) 40 mg tablet, Take 40 mg by mouth daily, Disp: , Rfl:     insulin glargine (LANTUS) 100 units/mL subcutaneous injection, Inject 25 Units under the skin daily , Disp: , Rfl:     levothyroxine 112 mcg tablet, Take 1 tablet by mouth daily, Disp: , Rfl:     levothyroxine 50 mcg tablet, Take 1 tablet by mouth daily, Disp: , Rfl:     ramipril (ALTACE) 5 mg capsule, Take 1 capsule (5 mg total) by mouth daily for 90 days, Disp: 90 capsule, Rfl: 2    Laboratory Results:  Results for orders placed or performed in visit on 05/11/21   Protein / creatinine ratio, urine   Result Value Ref Range    PROTEIN UA 28 5     EXT Creatinine Urine 82 0     EXTERNAL Ur Prot/Creat Ratio 0 35

## 2021-05-20 ENCOUNTER — TELEPHONE (OUTPATIENT)
Dept: NEPHROLOGY | Facility: CLINIC | Age: 76
End: 2021-05-20

## 2021-05-20 NOTE — TELEPHONE ENCOUNTER
Rachelle Ly from [facility] called the office and left a voicemail asking if someone could please give her a call  She said Dr Carlos Miner had ordered an [address] for this pt and she had some questions in regards to the order

## 2021-05-20 NOTE — TELEPHONE ENCOUNTER
Christal Velarde who is requesting the order slip for pt upcoming study  Duplex order has been faxed to [phone]

## 2021-09-01 ENCOUNTER — OFFICE VISIT (OUTPATIENT)
Dept: NEPHROLOGY | Facility: CLINIC | Age: 76
End: 2021-09-01
Payer: COMMERCIAL

## 2021-09-01 ENCOUNTER — PREP FOR PROCEDURE (OUTPATIENT)
Dept: INTERVENTIONAL RADIOLOGY/VASCULAR | Facility: CLINIC | Age: 76
End: 2021-09-01

## 2021-09-01 VITALS
SYSTOLIC BLOOD PRESSURE: 160 MMHG | DIASTOLIC BLOOD PRESSURE: 65 MMHG | WEIGHT: 227 LBS | HEART RATE: 67 BPM | BODY MASS INDEX: 32.5 KG/M2 | HEIGHT: 70 IN

## 2021-09-01 DIAGNOSIS — I12.9 HYPERTENSIVE CHRONIC KIDNEY DISEASE WITH STAGE 1 THROUGH STAGE 4 CHRONIC KIDNEY DISEASE, OR UNSPECIFIED CHRONIC KIDNEY DISEASE: ICD-10-CM

## 2021-09-01 DIAGNOSIS — N18.4 ANEMIA OF CHRONIC RENAL FAILURE, STAGE 4 (SEVERE) (HCC): ICD-10-CM

## 2021-09-01 DIAGNOSIS — E11.21 DIABETIC NEPHROPATHY ASSOCIATED WITH TYPE 2 DIABETES MELLITUS (HCC): ICD-10-CM

## 2021-09-01 DIAGNOSIS — R80.1 PERSISTENT PROTEINURIA: ICD-10-CM

## 2021-09-01 DIAGNOSIS — N18.4 CKD STAGE 4 DUE TO TYPE 2 DIABETES MELLITUS (HCC): Primary | ICD-10-CM

## 2021-09-01 DIAGNOSIS — D63.1 ANEMIA OF CHRONIC RENAL FAILURE, STAGE 4 (SEVERE) (HCC): ICD-10-CM

## 2021-09-01 DIAGNOSIS — R76.8 ELEVATED SERUM IMMUNOGLOBULIN FREE LIGHT CHAIN LEVEL: ICD-10-CM

## 2021-09-01 DIAGNOSIS — E11.22 CKD STAGE 4 DUE TO TYPE 2 DIABETES MELLITUS (HCC): Primary | ICD-10-CM

## 2021-09-01 DIAGNOSIS — I50.42 CHRONIC COMBINED SYSTOLIC AND DIASTOLIC CONGESTIVE HEART FAILURE (HCC): ICD-10-CM

## 2021-09-01 DIAGNOSIS — T82.590A DIALYSIS AV FISTULA MALFUNCTION, INITIAL ENCOUNTER (HCC): Primary | ICD-10-CM

## 2021-09-01 DIAGNOSIS — N25.81 SECONDARY HYPERPARATHYROIDISM (HCC): ICD-10-CM

## 2021-09-01 PROCEDURE — 99214 OFFICE O/P EST MOD 30 MIN: CPT | Performed by: INTERNAL MEDICINE

## 2021-09-01 NOTE — LETTER
September 1, 2021     Reginald Casillas MD  [facility], [address] [place] [address]    Patient: Avelina Brian   YOB: 1945   Date of Visit: 9/1/2021       Dear Dr Susann Fothergill:    Thank you for referring Avelina Brian to me for evaluation  Below are the relevant portions of my assessment and plan of care  If you have questions, please do not hesitate to call me  I look forward to following Abner Ferguson along with you  Sincerely,        Cameron Venegas, DO                               ASSESSMENT and PLAN:  1   chronic kidney disease, stage IV, baseline creatinine high 2s to low threes most recent creatinine 2 84 estimated GFR 21 underlying disease most likely secondary diabetic nephropathy  2  Anemia of chronic kidney disease hemoglobin stable at 10 1   3  Hypertension, overall stable, initial blood pressure 160/65, repeat blood pressure improving to 140/60   4  CKD associated mineral bone disorder, PTH stable at 39 3   5  Hyperuricemia without evidence of gout, currently 10 7, continue to monitor  6  Abnormal free light chain assay, repeat SPEP/ UPEP negative for monoclonal gammopathy  7  Access: , noted right upper arm AV fistula, recent Doppler showing possible central access stenosis  Referral to Interventional Radiology for further evaluation/treatment    Overall renal function remains quite stable   Blood pressure volume status acceptable   Referral to Interventional Radiology for excess assessment given poor maturity   No other changes in current regimen, follow-up in 3 months with repeat labs at that time          CC: No Recipients

## 2021-09-09 ENCOUNTER — TELEPHONE (OUTPATIENT)
Dept: INTERVENTIONAL RADIOLOGY/VASCULAR | Facility: HOSPITAL | Age: 76
End: 2021-09-09

## 2021-09-13 ENCOUNTER — HOSPITAL ENCOUNTER (OUTPATIENT)
Dept: INTERVENTIONAL RADIOLOGY/VASCULAR | Facility: HOSPITAL | Age: 76
Discharge: HOME | End: 2021-09-13
Attending: RADIOLOGY
Payer: COMMERCIAL

## 2021-09-13 VITALS
WEIGHT: 227 LBS | OXYGEN SATURATION: 96 % | TEMPERATURE: 97.6 F | SYSTOLIC BLOOD PRESSURE: 126 MMHG | DIASTOLIC BLOOD PRESSURE: 63 MMHG | HEIGHT: 68 IN | RESPIRATION RATE: 16 BRPM | HEART RATE: 57 BPM | BODY MASS INDEX: 34.4 KG/M2

## 2021-09-13 DIAGNOSIS — T82.590A DIALYSIS AV FISTULA MALFUNCTION, INITIAL ENCOUNTER (HCC): ICD-10-CM

## 2021-09-13 LAB — GLUCOSE SERPL-MCNC: 97 MG/DL (ref 65–140)

## 2021-09-13 PROCEDURE — C1894 INTRO/SHEATH, NON-LASER: HCPCS

## 2021-09-13 PROCEDURE — C1725 CATH, TRANSLUMIN NON-LASER: HCPCS

## 2021-09-13 PROCEDURE — 36902 INTRO CATH DIALYSIS CIRCUIT: CPT

## 2021-09-13 PROCEDURE — 99152 MOD SED SAME PHYS/QHP 5/>YRS: CPT

## 2021-09-13 PROCEDURE — 76937 US GUIDE VASCULAR ACCESS: CPT | Performed by: STUDENT IN AN ORGANIZED HEALTH CARE EDUCATION/TRAINING PROGRAM

## 2021-09-13 PROCEDURE — C1769 GUIDE WIRE: HCPCS

## 2021-09-13 PROCEDURE — 82948 REAGENT STRIP/BLOOD GLUCOSE: CPT

## 2021-09-13 PROCEDURE — 99152 MOD SED SAME PHYS/QHP 5/>YRS: CPT | Performed by: STUDENT IN AN ORGANIZED HEALTH CARE EDUCATION/TRAINING PROGRAM

## 2021-09-13 PROCEDURE — 99153 MOD SED SAME PHYS/QHP EA: CPT

## 2021-09-13 PROCEDURE — 36902 INTRO CATH DIALYSIS CIRCUIT: CPT | Performed by: STUDENT IN AN ORGANIZED HEALTH CARE EDUCATION/TRAINING PROGRAM

## 2021-09-13 RX ORDER — LIDOCAINE WITH 8.4% SOD BICARB 0.9%(10ML)
SYRINGE (ML) INJECTION CODE/TRAUMA/SEDATION MEDICATION
Status: COMPLETED | OUTPATIENT
Start: 2021-09-13 | End: 2021-09-13

## 2021-09-13 RX ORDER — MIDAZOLAM HYDROCHLORIDE 2 MG/2ML
INJECTION, SOLUTION INTRAMUSCULAR; INTRAVENOUS CODE/TRAUMA/SEDATION MEDICATION
Status: COMPLETED | OUTPATIENT
Start: 2021-09-13 | End: 2021-09-13

## 2021-09-13 RX ORDER — FENTANYL CITRATE 50 UG/ML
INJECTION, SOLUTION INTRAMUSCULAR; INTRAVENOUS CODE/TRAUMA/SEDATION MEDICATION
Status: COMPLETED | OUTPATIENT
Start: 2021-09-13 | End: 2021-09-13

## 2021-09-13 RX ADMIN — Medication 3 ML: at 15:22

## 2021-09-13 RX ADMIN — IOHEXOL 15 ML: 350 INJECTION, SOLUTION INTRAVENOUS at 15:59

## 2021-09-13 RX ADMIN — MIDAZOLAM 1 MG: 1 INJECTION INTRAMUSCULAR; INTRAVENOUS at 15:25

## 2021-09-13 RX ADMIN — FENTANYL CITRATE 50 MCG: 50 INJECTION, SOLUTION INTRAMUSCULAR; INTRAVENOUS at 15:26

## 2021-09-13 NOTE — BRIEF OP NOTE (RAD/CATH)
INTERVENTIONAL RADIOLOGY PROCEDURE NOTE    Date: 9/13/2021    Procedure: IR AV FISTULAGRAM/GRAFTOGRAM    Preoperative diagnosis:   1  Dialysis AV fistula malfunction, initial encounter [facility])         Postoperative diagnosis: Same  Surgeon: Price Armijo MD     Assistant: None  No qualified resident was available  Blood loss: 5 ml    Specimens: none  Findings:   Fistula showed JA stenosis, treated with 6 and 7 mm HPB POBA  12 ml's contrast     Complications: None immediate      Anesthesia: conscious sedation

## 2021-09-13 NOTE — NURSING NOTE
Pressure device removed by staff from IR  No bleeding or hematoma  Dressing dry and intact  Positive bruit/thrill

## 2021-09-13 NOTE — PROGRESS NOTES
Interventional Radiology Preprocedure Note    History/Indication for procedure:   Alejandro Client is a 78 y o  male with a PMH of ESRD on HD dialyzed through a [facility] who presents for fistulagraphy for the indication of NMF  He has had no prior intervention  The access was created at [facility] 8 months ago     Relevant past medical history:    Past Medical History:   Diagnosis Date    Chronic kidney disease     Diabetes mellitus ([facility] )     Hypertension      Patient Active Problem List   Diagnosis    CKD stage 4 due to type 2 diabetes mellitus ([facility] )    Anemia of chronic renal failure, stage 4 (severe) (HCC)    Persistent proteinuria    Hypertensive chronic kidney disease with stage 1 through stage 4 chronic kidney disease, or unspecified chronic kidney disease    Elevated serum immunoglobulin free light chain level    Diabetic nephropathy associated with type 2 diabetes mellitus ([facility] )    Secondary hyperparathyroidism (HCC)    Chronic combined systolic and diastolic congestive heart failure (HCC)       /69 (BP Location: Left arm)   Pulse 73   Temp 98 5 °F (36 9 °C) (Temporal)   Resp 20   Ht 5' 8" (1 727 m)   Wt 103 kg (227 lb)   SpO2 96%   BMI 34 52 kg/m²     Medications:    Inpatient Medications:     Scheduled Medications:      Infusions:  No current facility-administered medications for this encounter        PRN:      Outpatient Medications:  Current Outpatient Medications on File Prior to Encounter   Medication Sig Dispense Refill    amLODIPine (NORVASC) 10 mg tablet Take 10 mg by mouth daily       apixaban (ELIQUIS) 5 mg Take 5 mg by mouth 2 (two) times a day       aspirin 81 mg chewable tablet Chew 1 tablet daily      atorvastatin (LIPITOR) 40 mg tablet Take 1 tablet by mouth daily      calcitriol (ROCALTROL) 0 25 mcg capsule Take 1 capsule (0 25 mcg total) by mouth daily 40 capsule 3    carvedilol (COREG) 12 5 mg tablet Take 1 tablet by mouth Twice daily      Cholecalciferol 50 MCG (2000 UT) CAPS Take 1 capsule by mouth daily      fluticasone (FLONASE) 50 mcg/act nasal spray as needed  two sprays each nostril daily       furosemide (LASIX) 40 mg tablet Take 40 mg by mouth daily      insulin glargine (LANTUS) 100 units/mL subcutaneous injection Inject 25 Units under the skin daily       levothyroxine 112 mcg tablet Take 1 tablet by mouth daily      levothyroxine 50 mcg tablet Take 1 tablet by mouth daily      ramipril (ALTACE) 5 mg capsule Take 1 capsule (5 mg total) by mouth daily for 90 days 90 capsule 2    [DISCONTINUED] ferrous sulfate 324 (65 Fe) mg Take 1 tablet (324 mg total) by mouth 2 (two) times a day before meals 180 tablet 3     No current facility-administered medications on file prior to encounter  No Known Allergies    Anticoagulants: none    ASA classification: ASA 3 - Patient with moderate systemic disease with functional limitations    Airway Assessment: III (soft and hard palate and base of uvula visible)    Relevant family history: None    Relevant review of systems: None    Prior sedation/anesthesia: yes    Can the patient lie flat? Yes     NPO Status: yes    Labs:   CBC with diff: No results found for: WBC, HGB, HCT, MCV, PLT, ADJUSTEDWBC, MCH, MCHC, RDW, MPV, NRBC  BMP/CMP:No results found for: NA, K, CL, CO2, ANIONGAP, BUN, CREATININE, GLUCOSE, CALCIUM, AST, ALT, ALKPHOS, PROT, BILITOT, EGFR,     Coags: No results found for: PT, PTT, INR,          Relevant imaging studies:   Reviewed  Directed physical examination:  CONSTITUTIONAL: The patient appeared well in no acute distress  NEUROLOGICAL: alert, awake, answering questions appropriately  PSYCHIATRIC: Affect normal   PULMONARY: No respiratory distress  CARDIAC: normal sinus rhythm on monitor, without tachycardia  GASTROINTESTINAL: abdomen was soft, round, nontender  EXTREMITIES: No cyanosis  [facility] has minimally weak thrill  No erythema or eschar  Assessment/Plan:    For diagnostic fistulagram and possible treatment  Sedation/Anesthesia plan: Moderate sedation will be used as needed for procedure  Consent with alternatives to the procedure, risks and benefits have been explained and discussed with the patient/patient's family: yes  During the informed consent process, the following was discussed, and the patient was educated concerning paclitaxel coated balloons and stents, which may be appropriate for the patient's up-coming fistulagram procedure: Analysis of randomized trials suggest a possible increased death rate after two years in patients treated with paclitaxel-coated balloons and paclitaxel-eluting stents compared to patients treated with control devices (non-coated balloons or bare metal stents) specifically in patients with lower extremity claudication  This has not been corroborated for dialysis access circuits  The specific cause for this observation is yet to be determined  Currently, the FDA believes that the benefits continue to outweigh the risks for approved paclitaxel-coated balloons and paclitaxel-eluting stents when used in accordance with their indications for use  The patient gave informed consent for the use of these devices if appropriately indicated for treatment

## 2021-09-13 NOTE — NURSING NOTE
Received from IR at 1601  Alert  Denies pain  Pressure device and dressing dry and intact  Positive bruit/thrill  No bleeding or hematoma  Respirations easy and non labored

## 2021-09-13 NOTE — DISCHARGE INSTRUCTIONS
Fistulagram   WHAT YOU NEED TO KNOW:   Your arm or leg my  be sore, swollen, and bruised after the procedure  This is normal and should get better in a few days  DISCHARGE INSTRUCTIONS:     Contact Interventional Radiology at [phone] and follow prompts if you experience any:    · You have a fever or chills  · Your puncture site is red, swollen, or draining pus  · You have nausea or are vomiting  · Your skin is itchy, swollen, or you have a rash  · You cannot feel a thrill over your graft or fistula  · You have questions or concerns about your condition or care  Seek care immediately if:     · You have bleeding that does not stop after 10 minutes of holding firm, direct pressure over the puncture site  · Blood soaks through your bandage  · Your hand or foot closest to the graft or fistula feels cold, painful, or numb  · Your hand or foot closest to the graft or fistula is pale or blue  · You have trouble moving your arm or leg closest to the graft or fistula  · Your bruise suddenly gets bigger  Care for your wound as directed:  Remove the bandage in 4 to 6 hours or as         directed  Wash the area once a day with soap and water  Gently pat the area dry  Apply firm, steady pressure to the puncture site if it bleeds  Use a clean gauze or towel to hold pressure for 10 to 15 minutes  Call 911 if you cannot stop the bleeding or the bleeding gets heavier  Feel for a thrill once a day or as directed  Place your index and second finger over your fistula or graft as directed  You should feel a vibration  The vibration means that blood is flowing through your graft or fistula correctly  Rest your arm or leg as directed  Do not lift anything heavier than 5 pounds or do strenuous activity for 24 hours  Prevent damage to your graft or fistula  Do not wear tight-fitting clothing over your graft or fistula   Do not wear tight jewelry on the arm or leg with the graft or fistula  Tell healthcare providers not to do, IVs, blood draws, and blood pressure readings in the arm with your graft or fistula  Do not allow flu shots or vaccinations in your arm with your graft or fistula  Follow up with your healthcare provider as directedProcedural Sedation   WHAT YOU NEED TO KNOW:   Procedural sedation is medicine used during procedures to help you feel relaxed and calm  You will remember little to none of the procedure  After sedation you may feel tired, weak, or unsteady on your feet  You may also have trouble concentrating or short-term memory loss  These symptoms should go away in 24 hours or less  DISCHARGE INSTRUCTIONS:     Call 911 or have someone else call for any of the following:   · You have sudden trouble breathing      · You cannot be woken  Contact Interventional Radiology at [phone]   [facility] PATIENTS: Contact Interventional Radiology at [phone]) Roman Blackwell PATIENTS: Contact Interventional Radiology at [phone]) if any of the following occur:     · You have a severe headache or dizziness      · Your heart is beating faster than usual     · You have a fever or chills      · Your skin is itchy, swollen, or you have a rash      · You have nausea or are vomiting for more than 8 hours after the procedure       · You have questions or concerns about your condition or care  Self-care:   · Have someone stay with you for 24 hours  This person can drive you to errands and help you do things around the house  This person can also watch for problems       · Rest and do quiet activities for 24 hours  Do not exercise, ride a bike, or play sports  Stand up slowly to prevent dizziness and falls  Take short walks around the house with another person  Slowly return to your usual activities the next day       · Do not drive or use dangerous machines or tools for 24 hours  You may injure yourself or others  Examples include a lawnmower, saw, or drill   Do not return to work for 24 hours if you use dangerous machines or tools for work       · Do not make important decisions for 24 hours  For example, do not sign important papers or invest money       · Drink liquids as directed  Liquids help flush the sedation medicine out of your body  Ask how much liquid to drink each day and which liquids are best for you       · Eat small, frequent meals to prevent nausea and vomiting  Start with clear liquids such as juice or broth  If you do not vomit after clear liquids, you can eat your usual foods       · Do not drink alcohol or take medicines that make you drowsy  This includes medicines that help you sleep and anxiety medicines  Ask your healthcare provider if it is safe for you to take pain medicine  Follow up with your healthcare provider as directed: Write down your questions so you remember to ask them during your visits

## 2021-11-01 ENCOUNTER — TELEPHONE (OUTPATIENT)
Dept: NEPHROLOGY | Facility: CLINIC | Age: 76
End: 2021-11-01

## 2021-11-12 ENCOUNTER — TELEPHONE (OUTPATIENT)
Dept: NEPHROLOGY | Facility: CLINIC | Age: 76
End: 2021-11-12

## 2021-12-10 ENCOUNTER — TELEPHONE (OUTPATIENT)
Dept: NEPHROLOGY | Facility: CLINIC | Age: 76
End: 2021-12-10

## 2024-03-12 NOTE — PROGRESS NOTES
Reviewed laboratory studies, unfortunate creatinine has continued to rise at 3 28, GFR, denies any significant lower extremity swelling, try to reduce Lasix to 40 mg daily, repeat BMP in 2 weeks  Will also reach out to vascular surgery, Dr Johnny Wei, in regards to AV access placement  Alert and oriented, no focal deficits, no motor or sensory deficits.

## 2024-05-31 NOTE — PROGRESS NOTES
Abdominal binder has been sent  to tube station # [id number].   NEPHROLOGY OUTPATIENT PROGRESS NOTE   Mark Anthony Haque 78 y o  male MRN: [id number]  Reason for visit:   Chronic kidney disease    ASSESSMENT and PLAN:  1   chronic kidney disease, stage IV, baseline creatinine high 2s to low threes most recent creatinine 2 84 estimated GFR 21 underlying disease most likely secondary diabetic nephropathy  2  Anemia of chronic kidney disease hemoglobin stable at 10 1   3  Hypertension, overall stable, initial blood pressure 160/65, repeat blood pressure improving to 140/60   4  CKD associated mineral bone disorder, PTH stable at 39 3   5  Hyperuricemia without evidence of gout, currently 10 7, continue to monitor  6  Abnormal free light chain assay, repeat SPEP/ UPEP negative for monoclonal gammopathy  7  Access: , noted right upper arm AV fistula, recent Doppler showing possible central access stenosis  Referral to Interventional Radiology for further evaluation/treatment    Overall renal function remains quite stable   Blood pressure volume status acceptable   Referral to Interventional Radiology for excess assessment given poor maturity   No other changes in current regimen, follow-up in 3 months with repeat labs at that time  SUBJECTIVE / INTERVAL HISTORY:    Seen and examined  Patient doing well  Offers no new complaints  No reports of chest pain shortness of breath  Appetite has been normal   Denies any nausea vomiting or diarrhea  OBJECTIVE:  /65 (BP Location: Left arm, Patient Position: Sitting, Cuff Size: Standard)   Pulse 67   Ht 5' 10" (1 778 m)   Wt 103 kg (227 lb)   BMI 32 57 kg/m²   Vitals:    09/01/21 1117   Weight: 103 kg (227 lb)       Physical Exam  Constitutional:       Appearance: He is not ill-appearing  HENT:      Head: Normocephalic and atraumatic  Eyes:      General: No scleral icterus  Cardiovascular:      Rate and Rhythm: Normal rate and regular rhythm     Pulmonary:      Effort: Pulmonary effort is normal       Breath sounds: Normal breath sounds  Abdominal:      General: There is no distension  Palpations: Abdomen is soft  Musculoskeletal:      Right lower leg: No edema  Left lower leg: No edema  Comments: AV access, noted thrill, decreased palpable thrill post mid access   Skin:     General: Skin is warm and dry  Findings: No rash  Neurological:      Mental Status: He is alert and oriented to person, place, and time  Medications:    Current Outpatient Medications:     amLODIPine (NORVASC) 10 mg tablet, Take 10 mg by mouth daily , Disp: , Rfl:     apixaban (ELIQUIS) 5 mg, Take 5 mg by mouth 2 (two) times a day , Disp: , Rfl:     aspirin 81 mg chewable tablet, Chew 1 tablet daily, Disp: , Rfl:     atorvastatin (LIPITOR) 40 mg tablet, Take 1 tablet by mouth daily, Disp: , Rfl:     calcitriol (ROCALTROL) 0 25 mcg capsule, Take 1 capsule (0 25 mcg total) by mouth daily, Disp: 40 capsule, Rfl: 3    carvedilol (COREG) 12 5 mg tablet, Take 1 tablet by mouth Twice daily, Disp: , Rfl:     Cholecalciferol 50 MCG (2000 UT) CAPS, Take 1 capsule by mouth daily, Disp: , Rfl:     ferrous sulfate 324 (65 Fe) mg, Take 1 tablet (324 mg total) by mouth 2 (two) times a day before meals, Disp: 180 tablet, Rfl: 3    fluticasone (FLONASE) 50 mcg/act nasal spray, as needed   two sprays each nostril daily , Disp: , Rfl:     furosemide (LASIX) 40 mg tablet, Take 40 mg by mouth daily, Disp: , Rfl:     insulin glargine (LANTUS) 100 units/mL subcutaneous injection, Inject 25 Units under the skin daily , Disp: , Rfl:     levothyroxine 112 mcg tablet, Take 1 tablet by mouth daily, Disp: , Rfl:     levothyroxine 50 mcg tablet, Take 1 tablet by mouth daily, Disp: , Rfl:     ramipril (ALTACE) 5 mg capsule, Take 1 capsule (5 mg total) by mouth daily for 90 days, Disp: 90 capsule, Rfl: 2    Laboratory Results:  Results for orders placed or performed in visit on 08/24/21   Microalbumin / creatinine urine ratio   Result Value Ref Range    EXT Creatinine Urine 75 9     Microalbum  ,U,Random 9 3     EXTERNAL Microalb/Creat Ratio 122 5